# Patient Record
Sex: FEMALE | Race: WHITE | Employment: STUDENT | URBAN - METROPOLITAN AREA
[De-identification: names, ages, dates, MRNs, and addresses within clinical notes are randomized per-mention and may not be internally consistent; named-entity substitution may affect disease eponyms.]

---

## 2023-05-11 ENCOUNTER — HOSPITAL ENCOUNTER (EMERGENCY)
Facility: HOSPITAL | Age: 23
Discharge: HOME | End: 2023-05-12
Attending: EMERGENCY MEDICINE
Payer: COMMERCIAL

## 2023-05-11 DIAGNOSIS — R00.2 PALPITATIONS: Primary | ICD-10-CM

## 2023-05-11 LAB — GLUCOSE BLDC GLUCOMTR-MCNC: 84 MG/DL (ref 70–99)

## 2023-05-11 PROCEDURE — 99283 EMERGENCY DEPT VISIT LOW MDM: CPT

## 2023-05-11 PROCEDURE — 99284 EMERGENCY DEPT VISIT MOD MDM: CPT

## 2023-05-11 PROCEDURE — 82962 GLUCOSE BLOOD TEST: CPT

## 2023-05-11 PROCEDURE — 36415 COLL VENOUS BLD VENIPUNCTURE: CPT

## 2023-05-11 PROCEDURE — 93010 ELECTROCARDIOGRAM REPORT: CPT

## 2023-05-11 PROCEDURE — 93005 ELECTROCARDIOGRAM TRACING: CPT

## 2023-05-12 VITALS
OXYGEN SATURATION: 100 % | SYSTOLIC BLOOD PRESSURE: 117 MMHG | DIASTOLIC BLOOD PRESSURE: 76 MMHG | RESPIRATION RATE: 19 BRPM | TEMPERATURE: 98 F | WEIGHT: 162 LBS | HEIGHT: 69 IN | BODY MASS INDEX: 23.99 KG/M2 | HEART RATE: 75 BPM

## 2023-05-12 LAB
ANION GAP SERPL CALC-SCNC: 5 MMOL/L (ref 0–18)
ATRIAL RATE: 100 BPM
BASOPHILS # BLD AUTO: 0.05 X10(3) UL (ref 0–0.2)
BASOPHILS NFR BLD AUTO: 0.6 %
BUN BLD-MCNC: 14 MG/DL (ref 7–18)
BUN/CREAT SERPL: 20.6 (ref 10–20)
CALCIUM BLD-MCNC: 9.5 MG/DL (ref 8.5–10.1)
CHLORIDE SERPL-SCNC: 108 MMOL/L (ref 98–112)
CO2 SERPL-SCNC: 26 MMOL/L (ref 21–32)
CREAT BLD-MCNC: 0.68 MG/DL
DEPRECATED RDW RBC AUTO: 37.4 FL (ref 35.1–46.3)
EOSINOPHIL # BLD AUTO: 0.09 X10(3) UL (ref 0–0.7)
EOSINOPHIL NFR BLD AUTO: 1 %
ERYTHROCYTE [DISTWIDTH] IN BLOOD BY AUTOMATED COUNT: 11.8 % (ref 11–15)
GFR SERPLBLD BASED ON 1.73 SQ M-ARVRAT: 126 ML/MIN/1.73M2 (ref 60–?)
GLUCOSE BLD-MCNC: 89 MG/DL (ref 70–99)
HCT VFR BLD AUTO: 39.9 %
HGB BLD-MCNC: 13.3 G/DL
IMM GRANULOCYTES # BLD AUTO: 0.02 X10(3) UL (ref 0–1)
IMM GRANULOCYTES NFR BLD: 0.2 %
LYMPHOCYTES # BLD AUTO: 3.45 X10(3) UL (ref 1–4)
LYMPHOCYTES NFR BLD AUTO: 38 %
MCH RBC QN AUTO: 28.9 PG (ref 26–34)
MCHC RBC AUTO-ENTMCNC: 33.3 G/DL (ref 31–37)
MCV RBC AUTO: 86.7 FL
MONOCYTES # BLD AUTO: 0.6 X10(3) UL (ref 0.1–1)
MONOCYTES NFR BLD AUTO: 6.6 %
NEUTROPHILS # BLD AUTO: 4.87 X10 (3) UL (ref 1.5–7.7)
NEUTROPHILS # BLD AUTO: 4.87 X10(3) UL (ref 1.5–7.7)
NEUTROPHILS NFR BLD AUTO: 53.6 %
OSMOLALITY SERPL CALC.SUM OF ELEC: 288 MOSM/KG (ref 275–295)
P AXIS: 73 DEGREES
P-R INTERVAL: 136 MS
PLATELET # BLD AUTO: 316 10(3)UL (ref 150–450)
POTASSIUM SERPL-SCNC: 4.2 MMOL/L (ref 3.5–5.1)
Q-T INTERVAL: 344 MS
QRS DURATION: 104 MS
QTC CALCULATION (BEZET): 443 MS
R AXIS: 76 DEGREES
RBC # BLD AUTO: 4.6 X10(6)UL
SODIUM SERPL-SCNC: 139 MMOL/L (ref 136–145)
T AXIS: 8 DEGREES
TSI SER-ACNC: 1.67 MIU/ML (ref 0.36–3.74)
VENTRICULAR RATE: 100 BPM
WBC # BLD AUTO: 9.1 X10(3) UL (ref 4–11)

## 2023-05-12 PROCEDURE — 80048 BASIC METABOLIC PNL TOTAL CA: CPT | Performed by: EMERGENCY MEDICINE

## 2023-05-12 PROCEDURE — 84443 ASSAY THYROID STIM HORMONE: CPT | Performed by: EMERGENCY MEDICINE

## 2023-05-12 PROCEDURE — 85025 COMPLETE CBC W/AUTO DIFF WBC: CPT | Performed by: EMERGENCY MEDICINE

## 2023-05-12 NOTE — ED INITIAL ASSESSMENT (HPI)
Pt reports palpitations x 1 week worse with movement, pt reports only new thing is birth control started 3 months ago.  Pt also reports increase in thirst and urination 1-2 weeks

## 2023-05-23 ENCOUNTER — OFFICE VISIT (OUTPATIENT)
Dept: CARDIOLOGY | Age: 23
End: 2023-05-23

## 2023-05-23 VITALS
HEART RATE: 78 BPM | DIASTOLIC BLOOD PRESSURE: 60 MMHG | BODY MASS INDEX: 25.03 KG/M2 | WEIGHT: 169 LBS | RESPIRATION RATE: 14 BRPM | SYSTOLIC BLOOD PRESSURE: 115 MMHG | HEIGHT: 69 IN

## 2023-05-23 DIAGNOSIS — R00.0 TACHYCARDIA, UNSPECIFIED: ICD-10-CM

## 2023-05-23 DIAGNOSIS — R94.31 ABNORMAL ELECTROCARDIOGRAM (ECG) (EKG): ICD-10-CM

## 2023-05-23 DIAGNOSIS — R00.2 PALPITATIONS: Primary | ICD-10-CM

## 2023-05-23 PROCEDURE — 99204 OFFICE O/P NEW MOD 45 MIN: CPT | Performed by: INTERNAL MEDICINE

## 2023-05-23 RX ORDER — DROSPIRENONE AND ETHINYL ESTRADIOL 0.02-3(28)
KIT ORAL
COMMUNITY
Start: 2023-05-20

## 2023-05-23 RX ORDER — MULTIVITAMIN,THER AND MINERALS
TABLET ORAL
COMMUNITY

## 2023-06-05 ENCOUNTER — HOSPITAL ENCOUNTER (OUTPATIENT)
Dept: CV DIAGNOSTICS | Age: 23
Discharge: HOME | End: 2023-06-05
Attending: INTERNAL MEDICINE

## 2023-06-05 DIAGNOSIS — R00.2 PALPITATIONS: ICD-10-CM

## 2023-07-03 ENCOUNTER — TELEPHONE (OUTPATIENT)
Dept: CARDIOLOGY | Age: 23
End: 2023-07-03

## 2023-07-12 ENCOUNTER — APPOINTMENT (OUTPATIENT)
Dept: CV DIAGNOSTICS | Age: 23
End: 2023-07-12
Attending: INTERNAL MEDICINE

## 2023-08-02 ENCOUNTER — TELEPHONE (OUTPATIENT)
Dept: CARDIOLOGY | Age: 23
End: 2023-08-02

## 2023-08-03 ENCOUNTER — TELEPHONE (OUTPATIENT)
Dept: CARDIOLOGY | Age: 23
End: 2023-08-03

## 2023-08-03 DIAGNOSIS — R94.31 ABNORMAL ELECTROCARDIOGRAM (ECG) (EKG): Primary | ICD-10-CM

## 2023-08-04 ENCOUNTER — TELEPHONE (OUTPATIENT)
Dept: CARDIOLOGY | Age: 23
End: 2023-08-04

## 2023-09-06 ENCOUNTER — TELEPHONE (OUTPATIENT)
Dept: CARDIOLOGY | Age: 23
End: 2023-09-06

## 2023-09-07 ENCOUNTER — TELEPHONE (OUTPATIENT)
Dept: CARDIOLOGY | Age: 23
End: 2023-09-07

## 2023-09-29 ENCOUNTER — OFFICE VISIT (OUTPATIENT)
Dept: CARDIOLOGY | Age: 23
End: 2023-09-29

## 2023-09-29 ENCOUNTER — TELEPHONE (OUTPATIENT)
Dept: CARDIOLOGY | Age: 23
End: 2023-09-29

## 2023-09-29 VITALS
HEIGHT: 69 IN | BODY MASS INDEX: 26.33 KG/M2 | DIASTOLIC BLOOD PRESSURE: 70 MMHG | RESPIRATION RATE: 16 BRPM | HEART RATE: 88 BPM | WEIGHT: 177.78 LBS | SYSTOLIC BLOOD PRESSURE: 124 MMHG

## 2023-09-29 DIAGNOSIS — I95.1 ORTHOSTATIC HYPOTENSION DYSAUTONOMIC SYNDROME: Primary | ICD-10-CM

## 2023-09-29 PROCEDURE — 99213 OFFICE O/P EST LOW 20 MIN: CPT | Performed by: INTERNAL MEDICINE

## 2023-09-29 RX ORDER — MIDODRINE HYDROCHLORIDE 2.5 MG/1
2.5 TABLET ORAL 2 TIMES DAILY
Qty: 60 TABLET | Refills: 1 | Status: SHIPPED | OUTPATIENT
Start: 2023-09-29 | End: 2023-10-12 | Stop reason: DRUGHIGH

## 2023-10-05 ENCOUNTER — TELEPHONE (OUTPATIENT)
Dept: CARDIOLOGY | Age: 23
End: 2023-10-05

## 2023-10-05 DIAGNOSIS — I95.1 ORTHOSTATIC HYPOTENSION DYSAUTONOMIC SYNDROME: ICD-10-CM

## 2023-10-06 ENCOUNTER — TELEPHONE (OUTPATIENT)
Dept: CARDIOLOGY | Age: 23
End: 2023-10-06

## 2023-10-09 ENCOUNTER — APPOINTMENT (OUTPATIENT)
Dept: LAB | Age: 23
End: 2023-10-09

## 2023-10-12 RX ORDER — MIDODRINE HYDROCHLORIDE 2.5 MG/1
2.5 TABLET ORAL 3 TIMES DAILY
Qty: 270 TABLET | Refills: 0 | Status: SHIPPED | OUTPATIENT
Start: 2023-10-12

## 2023-10-22 ENCOUNTER — OFF PREMISE CHARGES (OUTPATIENT)
Dept: CARDIOLOGY | Age: 23
End: 2023-10-22

## 2023-10-22 DIAGNOSIS — R00.2 PALPITATIONS: ICD-10-CM

## 2023-10-22 PROCEDURE — 93244 EXT ECG>48HR<7D REV&INTERPJ: CPT | Performed by: INTERNAL MEDICINE

## 2024-01-29 DIAGNOSIS — I95.1 ORTHOSTATIC HYPOTENSION DYSAUTONOMIC SYNDROME: ICD-10-CM

## 2024-01-31 ENCOUNTER — TELEPHONE (OUTPATIENT)
Dept: CARDIOLOGY | Age: 24
End: 2024-01-31

## 2024-01-31 RX ORDER — MIDODRINE HYDROCHLORIDE 2.5 MG/1
2.5 TABLET ORAL 3 TIMES DAILY
Qty: 270 TABLET | Refills: 0 | Status: SHIPPED | OUTPATIENT
Start: 2024-01-31 | End: 2024-01-31